# Patient Record
Sex: FEMALE | Race: ASIAN | NOT HISPANIC OR LATINO
[De-identification: names, ages, dates, MRNs, and addresses within clinical notes are randomized per-mention and may not be internally consistent; named-entity substitution may affect disease eponyms.]

---

## 2020-07-02 ENCOUNTER — APPOINTMENT (OUTPATIENT)
Dept: CARDIOLOGY | Facility: CLINIC | Age: 40
End: 2020-07-02
Payer: MEDICAID

## 2020-07-02 VITALS
DIASTOLIC BLOOD PRESSURE: 86 MMHG | SYSTOLIC BLOOD PRESSURE: 121 MMHG | RESPIRATION RATE: 18 BRPM | HEART RATE: 68 BPM | TEMPERATURE: 97.9 F | WEIGHT: 163 LBS | HEIGHT: 66.5 IN | BODY MASS INDEX: 25.89 KG/M2 | OXYGEN SATURATION: 96 %

## 2020-07-02 DIAGNOSIS — R42 DIZZINESS AND GIDDINESS: ICD-10-CM

## 2020-07-02 DIAGNOSIS — R06.02 SHORTNESS OF BREATH: ICD-10-CM

## 2020-07-02 DIAGNOSIS — R00.2 PALPITATIONS: ICD-10-CM

## 2020-07-02 PROBLEM — Z00.00 ENCOUNTER FOR PREVENTIVE HEALTH EXAMINATION: Status: ACTIVE | Noted: 2020-07-02

## 2020-07-02 PROCEDURE — 99204 OFFICE O/P NEW MOD 45 MIN: CPT | Mod: 25

## 2020-07-02 PROCEDURE — 93015 CV STRESS TEST SUPVJ I&R: CPT

## 2020-07-02 PROCEDURE — 93306 TTE W/DOPPLER COMPLETE: CPT

## 2020-07-06 PROBLEM — R06.02 SHORTNESS OF BREATH: Status: ACTIVE | Noted: 2020-07-02

## 2020-07-06 PROBLEM — R00.2 PALPITATIONS: Status: ACTIVE | Noted: 2020-07-02

## 2020-07-06 PROBLEM — R42 DIZZINESS: Status: ACTIVE | Noted: 2020-07-06

## 2020-07-08 ENCOUNTER — NON-APPOINTMENT (OUTPATIENT)
Age: 40
End: 2020-07-08

## 2020-07-24 NOTE — REASON FOR VISIT
[Dizziness] : dizziness [Consultation] : a consultation regarding [Palpitations] : palpitations [FreeTextEntry1] : 40 year-old female with h/o polycystic kidney disease, s/p surgery presents for evaluation of palpitations. Patient reports palpitations with nausea when waking up, worse when supine as well as palpitations with exertion.  Patient reports FATIMA. She reports having asthma. Patient had h/o syncope as a child. She does not drink coffee due to fast HR. I advised patient to undergo an echocardiogram and a treadmill stress test. I advised patient to wear a Holter monitor.

## 2020-07-24 NOTE — PHYSICAL EXAM
[Normal Appearance] : normal appearance [General Appearance - Well Developed] : well developed [Well Groomed] : well groomed [No Deformities] : no deformities [General Appearance - In No Acute Distress] : no acute distress [General Appearance - Well Nourished] : well nourished [Normal Oral Mucosa] : normal oral mucosa [Normal Conjunctiva] : the conjunctiva exhibited no abnormalities [Eyelids - No Xanthelasma] : the eyelids demonstrated no xanthelasmas [Normal Jugular Venous A Waves Present] : normal jugular venous A waves present [No Oral Cyanosis] : no oral cyanosis [No Oral Pallor] : no oral pallor [Normal Jugular Venous V Waves Present] : normal jugular venous V waves present [No Jugular Venous Philip A Waves] : no jugular venous philip A waves [Heart Sounds] : normal S1 and S2 [Murmurs] : no murmurs present [Heart Rate And Rhythm] : heart rate and rhythm were normal [Exaggerated Use Of Accessory Muscles For Inspiration] : no accessory muscle use [Respiration, Rhythm And Depth] : normal respiratory rhythm and effort [Abdomen Tenderness] : non-tender [Abdomen Soft] : soft [Auscultation Breath Sounds / Voice Sounds] : lungs were clear to auscultation bilaterally [Abnormal Walk] : normal gait [Abdomen Mass (___ Cm)] : no abdominal mass palpated [Nail Clubbing] : no clubbing of the fingernails [Gait - Sufficient For Exercise Testing] : the gait was sufficient for exercise testing [Cyanosis, Localized] : no localized cyanosis [] : no ischemic changes [Petechial Hemorrhages (___cm)] : no petechial hemorrhages [Oriented To Time, Place, And Person] : oriented to person, place, and time [Affect] : the affect was normal [Mood] : the mood was normal [No Anxiety] : not feeling anxious